# Patient Record
Sex: MALE | Race: WHITE | Employment: FULL TIME
[De-identification: names, ages, dates, MRNs, and addresses within clinical notes are randomized per-mention and may not be internally consistent; named-entity substitution may affect disease eponyms.]

---

## 2022-03-19 PROBLEM — R97.20 ELEVATED PSA: Status: ACTIVE | Noted: 2017-08-02

## 2023-04-13 ENCOUNTER — HOSPITAL ENCOUNTER (OUTPATIENT)
Facility: HOSPITAL | Age: 63
Setting detail: RECURRING SERIES
Discharge: HOME | End: 2023-04-16
Payer: COMMERCIAL

## 2023-04-13 PROCEDURE — 97140 MANUAL THERAPY 1/> REGIONS: CPT

## 2023-04-13 PROCEDURE — 97162 PT EVAL MOD COMPLEX 30 MIN: CPT

## 2023-04-19 ENCOUNTER — HOSPITAL ENCOUNTER (OUTPATIENT)
Facility: HOSPITAL | Age: 63
Setting detail: RECURRING SERIES
Discharge: HOME | End: 2023-04-22
Payer: COMMERCIAL

## 2023-04-19 PROCEDURE — 97110 THERAPEUTIC EXERCISES: CPT

## 2023-04-19 PROCEDURE — 97140 MANUAL THERAPY 1/> REGIONS: CPT

## 2023-04-19 NOTE — PROGRESS NOTES
[facility]   5/3/2023  3:00 PM Jesus Mortensen, PTA [facility]   5/8/2023  3:00 PM Jesus Mortensen, PTA [facility]   5/10/2023  3:00 PM Jesus Mortensen, PTA [facility]   5/15/2023  3:00 PM Jesus Mortensen, PTA [facility]   5/17/2023  3:00 PM Jan Loving, PT [facility]   5/22/2023  3:00 PM Jesus Mortensen, PTA [facility]   5/24/2023  3:00 PM Jesus Mortensen, PTA [facility]   5/30/2023  3:00 PM Caryl Franco, PT [facility]

## 2023-04-24 ENCOUNTER — HOSPITAL ENCOUNTER (OUTPATIENT)
Facility: HOSPITAL | Age: 63
Setting detail: RECURRING SERIES
Discharge: HOME | End: 2023-04-27
Payer: COMMERCIAL

## 2023-04-24 PROCEDURE — 97140 MANUAL THERAPY 1/> REGIONS: CPT

## 2023-04-24 PROCEDURE — 97110 THERAPEUTIC EXERCISES: CPT

## 2023-04-24 NOTE — PROGRESS NOTES
PHYSICAL / OCCUPATIONAL THERAPY - DAILY TREATMENT NOTE (updated )    Patient Name: Krystyna Johnsonney    Date: 2023    : 1960  Insurance: Payor: Trevor Osler / Plan: Donnell Verma / Product Type: *No Product type* /      Patient  verified Yes     Visit #   Current / Total 3 32   Time   In / Out 300 340   Pain   In / Out 0/10 0/10   Subjective Functional Status/Changes: \"Shoulder feels great\"   Changes to:  Meds, Allergies, Med Hx, Sx Hx? If yes, update Summary List no       TREATMENT AREA =  Right shoulder pain [M25.511]    OBJECTIVE         Therapeutic Procedures: Tx Min Billable or 1:1 Min (if diff from Tx Min) Procedure, Rationale, Specifics   25  48997 Therapeutic Exercise (timed):  increase ROM, strength, coordination, balance, and proprioception to improve patient's ability to progress to PLOF and address remaining functional goals. (see flow sheet as applicable)     Details if applicable:       15  98169 Manual Therapy (timed):  decrease pain, increase ROM, and increase tissue extensibility to improve patient's ability to progress to PLOF and address remaining functional goals. The manual therapy interventions were performed at a separate and distinct time from the therapeutic activities interventions .  (see flow sheet as applicable)     Details if applicable:  R shoulder PROM within protocol; STM to R posterior RC, R UT          Details if applicable:            Details if applicable:            Details if applicable:     36   BC Totals Reminder: bill using total billable min of TIMED therapeutic procedures (example: do not include dry needle or estim unattended, both untimed codes, in totals to left)  8-22 min = 1 unit; 23-37 min = 2 units; 38-52 min = 3 units; 53-67 min = 4 units; 68-82 min = 5 units   Total Total     [x]  Patient Education billed concurrently with other procedures   [x] Review HEP    [] Progressed/Changed HEP, detail:    [] Other detail:       Objective

## 2023-04-26 ENCOUNTER — HOSPITAL ENCOUNTER (OUTPATIENT)
Facility: HOSPITAL | Age: 63
Setting detail: RECURRING SERIES
Discharge: HOME | End: 2023-04-29
Payer: COMMERCIAL

## 2023-04-26 PROCEDURE — 97110 THERAPEUTIC EXERCISES: CPT

## 2023-04-26 PROCEDURE — 97140 MANUAL THERAPY 1/> REGIONS: CPT

## 2023-04-26 NOTE — PROGRESS NOTES
PHYSICAL / OCCUPATIONAL THERAPY - DAILY TREATMENT NOTE (updated )    Patient Name: Meghan Diane    Date: 2023    : 1960  Insurance: Payor: Facundo Mcgee / Plan: Anabell Bowers / Product Type: *No Product type* /      Patient  verified Yes     Visit #   Current / Total 4 32   Time   In / Out 2:54 3:30   Pain   In / Out 0/10 0/10   Subjective Functional Status/Changes: Pt reports he does not experience any pain in his shoulder, just tightness. Notes good compliance with HEP and sling wear. Sleeping supine in bed with sling donned. Changes to:  Meds, Allergies, Med Hx, Sx Hx? If yes, update Summary List no       TREATMENT AREA =  Right shoulder pain [M25.511]    OBJECTIVE         Therapeutic Procedures: Tx Min Billable or 1:1 Min (if diff from Tx Min) Procedure, Rationale, Specifics   110 Therapeutic Exercise (timed):  increase ROM, strength, coordination, balance, and proprioception to improve patient's ability to progress to PLOF and address remaining functional goals. (see flow sheet as applicable)     Details if applicable:       10 10 30736 Manual Therapy (timed):  decrease pain, increase ROM, and increase tissue extensibility to improve patient's ability to progress to PLOF and address remaining functional goals. The manual therapy interventions were performed at a separate and distinct time from the therapeutic activities interventions .  (see flow sheet as applicable)     Details if applicable:  light STM to R infraspinatus, scapular upward rotation glides, PROM flex, abd, ER          Details if applicable:            Details if applicable:            Details if applicable:     39 36 [facility] BC Totals Reminder: bill using total billable min of TIMED therapeutic procedures (example: do not include dry needle or estim unattended, both untimed codes, in totals to left)  8-22 min = 1 unit; 23-37 min = 2 units; 38-52 min = 3 units; 53-67 min = 4 units; 68-82 min = 5 units   Total Total     [x]

## 2023-05-01 ENCOUNTER — HOSPITAL ENCOUNTER (OUTPATIENT)
Facility: HOSPITAL | Age: 63
Setting detail: RECURRING SERIES
Discharge: HOME | End: 2023-05-04
Payer: COMMERCIAL

## 2023-05-01 PROCEDURE — 97110 THERAPEUTIC EXERCISES: CPT

## 2023-05-01 PROCEDURE — 97140 MANUAL THERAPY 1/> REGIONS: CPT

## 2023-05-01 NOTE — PROGRESS NOTES
PHYSICAL / OCCUPATIONAL THERAPY - DAILY TREATMENT NOTE (updated )    Patient Name: Sailaja Godfrey    Date: 2023    : 1960  Insurance: Payor: Lele Portillo / Plan: Leo Iqbal / Product Type: *No Product type* /      Patient  verified Yes     Visit #   Current / Total 5 32   Time   In / Out 3:00 3:49   Pain   In / Out 0/10 0/10    Subjective Functional Status/Changes: I don't really have much pain in my shoulder, I mainly just feel the aching and tension in my neck and biceps more than anything. Changes to:  Meds, Allergies, Med Hx, Sx Hx? If yes, update Summary List yes       TREATMENT AREA =  Right shoulder pain [M25.511]    OBJECTIVE         Therapeutic Procedures: Tx Min Billable or 1:1 Min (if diff from Tx Min) Procedure, Rationale, Specifics   35 26 [id number] Therapeutic Exercise (timed):  increase ROM, strength, coordination, balance, and proprioception to improve patient's ability to progress to PLOF and address remaining functional goals. (see flow sheet as applicable)     Details if applicable:        38100 Manual Therapy (timed):  decrease pain, increase ROM, increase tissue extensibility, decrease trigger points, and increase postural awareness to improve patient's ability to progress to PLOF and address remaining functional goals. The manual therapy interventions were performed at a separate and distinct time from the therapeutic activities interventions .  (see flow sheet as applicable)     Details if applicable:  STM/tissue mobs to infraspinatus/teres minor, side lying scapular glides, gentle biceps stretching and GH PROM all planes           Details if applicable:            Details if applicable:            Details if applicable:     52 40 [facility] Totals Reminder: bill using total billable min of TIMED therapeutic procedures (example: do not include dry needle or estim unattended, both untimed codes, in totals to left)  8-22 min = 1 unit; 23-37 min = 2 units; 38-52 min = 3 units;

## 2023-05-03 ENCOUNTER — HOSPITAL ENCOUNTER (OUTPATIENT)
Facility: HOSPITAL | Age: 63
Setting detail: RECURRING SERIES
Discharge: HOME | End: 2023-05-06
Payer: COMMERCIAL

## 2023-05-03 PROCEDURE — 97140 MANUAL THERAPY 1/> REGIONS: CPT

## 2023-05-03 NOTE — PROGRESS NOTES
PHYSICAL / OCCUPATIONAL THERAPY - DAILY TREATMENT NOTE (updated )    Patient Name: Tere Doing    Date: 5/3/2023    : 1960  Insurance: Payor: Koby Cancel / Plan: Lilli Arzate / Product Type: *No Product type* /      Patient  verified Yes     Visit #   Current / Total 6 32   Time   In / Out 300 326   Pain   In / Out 0/10 010   Subjective Functional Status/Changes: Patient states his shoulder has been feeling better overall. Patient denies any increased pain since his last appointment. Changes to:  Meds, Allergies, Med Hx, Sx Hx? If yes, update Summary List no       TREATMENT AREA =  Right shoulder pain [M25.511]    OBJECTIVE         Therapeutic Procedures: Tx Min Billable or 1:1 Min (if diff from Tx Min) Procedure, Rationale, Specifics   16 8 47684 Therapeutic Exercise (timed):  increase ROM, strength, coordination, balance, and proprioception to improve patient's ability to progress to PLOF and address remaining functional goals. (see flow sheet as applicable)     Details if applicable:       10 10 71620 Manual Therapy (timed):  decrease pain, increase ROM, increase tissue extensibility, and decrease trigger points to improve patient's ability to progress to PLOF and address remaining functional goals. The manual therapy interventions were performed at a separate and distinct time from the therapeutic activities interventions .  (see flow sheet as applicable)     Details if applicable:  STM to R posterior RC, R anterior deltoid; R shoulder PROM into flexion, abduction and ER per protocol          Details if applicable:            Details if applicable:            Details if applicable:     26 18 [facility] Totals Reminder: bill using total billable min of TIMED therapeutic procedures (example: do not include dry needle or estim unattended, both untimed codes, in totals to left)  8-22 min = 1 unit; 23-37 min = 2 units; 38-52 min = 3 units; 53-67 min = 4 units; 68-82 min = 5 units   Total Total     [x]

## 2023-05-08 ENCOUNTER — HOSPITAL ENCOUNTER (OUTPATIENT)
Facility: HOSPITAL | Age: 63
Setting detail: RECURRING SERIES
Discharge: HOME | End: 2023-05-11
Payer: COMMERCIAL

## 2023-05-08 PROCEDURE — 97110 THERAPEUTIC EXERCISES: CPT

## 2023-05-08 PROCEDURE — 97530 THERAPEUTIC ACTIVITIES: CPT

## 2023-05-08 PROCEDURE — 97140 MANUAL THERAPY 1/> REGIONS: CPT

## 2023-05-08 NOTE — PROGRESS NOTES
PHYSICAL / OCCUPATIONAL THERAPY - DAILY TREATMENT NOTE (updated )    Patient Name: Irma Escobar    Date: 2023    : 1960  Insurance: Payor: Caren Curling / Plan: Palmer Rose / Product Type: *No Product type* /      Patient  verified Yes     Visit #   Current / Total 7 32   Time   In / Out 3:02 3\"47   Pain   In / Out 0/10 0/10   Subjective Functional Status/Changes: I really don't have any pain in my shoulder and I have been trying to be careful so that I am not using it to activate the muscles like you guys told me to do. Changes to:  Meds, Allergies, Med Hx, Sx Hx? If yes, update Summary List no       TREATMENT AREA =  Right shoulder pain [M25.511]    OBJECTIVE         Therapeutic Procedures: Tx Min Billable or 1:1 Min (if diff from Tx Min) Procedure, Rationale, Specifics   21 14 22816 Therapeutic Exercise (timed):  increase ROM, strength, coordination, balance, and proprioception to improve patient's ability to progress to PLOF and address remaining functional goals. (see flow sheet as applicable)     Details if applicable:        25161 Therapeutic Activity (timed):  use of dynamic activities replicating functional movements to increase ROM, strength, coordination, balance, and proprioception in order to improve patient's ability to progress to PLOF and address remaining functional goals. (see flow sheet as applicable)     Details if applicable:      98897 Manual Therapy (timed):  decrease pain, increase ROM, increase tissue extensibility, and increase postural awareness to improve patient's ability to progress to PLOF and address remaining functional goals. The manual therapy interventions were performed at a separate and distinct time from the therapeutic activities interventions .  (see flow sheet as applicable)     Details if applicable:  STM/tissue mobs to infraspinatus/teres minor, side lying scapular glides and GH PROM all planes           Details if applicable:

## 2023-05-08 NOTE — PROGRESS NOTES
[address] [place] [address] PHYSICAL THERAPY  [facility], [place], [address]  Phone: [phone]   [phone]  PHYSICAL THERAPY PROGRESS NOTE  Patient Name: Dinora Youssef : 1960   Treatment/Medical Diagnosis: Right shoulder pain [M25.511]   Referral Source: Raina Alexandra*     Date of Initial Visit: 23 Attended Visits: 7 Missed Visits: 0     SUMMARY OF TREATMENT  Therapeutic Exercise for UE/shoulder focused mobility within   parameters/limitations of current passive protocol guidelines. manual intervention, cryotherapy, patient education and HEP. CURRENT STATUS  Pt reports 65% overall improvement: improvement since initial evaluation with no reoccurrence of pain in > 1 week. Pt is making good progress with gaining passive mobility at expectation within parameters/limitations of current passive protocol guidelines. Current improvements: Pt reports the most functional improvement with decreased frequency/intensity of pain/discomfort as well as increased passive mobility with minimal pain with pushing all allowable maximum passive end ranges. Remaining functional limitations: Limitations within  parameters/limitations of current passive protocol guidelines. Objective measures:  R shoulder PROM (Measured in supine)= Flexion= 140 deg; Scaption/Abduction= 120 deg; ER= 55 deg (measured at approximately 45 degrees abduction) ; IR= not assessed       FOTO 58/100    SHORT TERM GOALS:    Pt will be educated in/compliant with appropriate HEP to decrease pain, increase ROM, increase strength and return pt to PLOF. Status at last Eval: issued at POC  Current Status: Pt reports independence and compliance with current HEP  Goal Met?  yes    2. Pt will increase R shoulder flexion PROM to at least 120 deg as per MD protocol for improved ease with dressing. Status at last Eval: 85 deg  Current Status: 140 deg  Goal Met?  yes    3.  Pt will improve R

## 2023-05-10 ENCOUNTER — HOSPITAL ENCOUNTER (OUTPATIENT)
Facility: HOSPITAL | Age: 63
Setting detail: RECURRING SERIES
Discharge: HOME | End: 2023-05-13
Payer: COMMERCIAL

## 2023-05-10 PROCEDURE — 97110 THERAPEUTIC EXERCISES: CPT

## 2023-05-10 PROCEDURE — 97140 MANUAL THERAPY 1/> REGIONS: CPT

## 2023-05-10 PROCEDURE — 97530 THERAPEUTIC ACTIVITIES: CPT

## 2023-05-10 NOTE — PROGRESS NOTES
PHYSICAL / OCCUPATIONAL THERAPY - DAILY TREATMENT NOTE (updated )    Patient Name: Erik Herrera    Date: 5/10/2023    : 1960  Insurance: Payor: Moan Knapp / Plan: Sabino Marcelino / Product Type: *No Product type* /      Patient  verified Yes     Visit #   Current / Total 8 32   Time   In / Out 3:06 3:52   Pain   In / Out 0/10 0/10   Subjective Functional Status/Changes: Pt reports Dr Messi Flores was very pleased with his motion; Dc'd sling and will follow up in another month. Changes to:  Meds, Allergies, Med Hx, Sx Hx? If yes, update Summary List no       TREATMENT AREA =  Right shoulder pain [M25.511]    OBJECTIVE         Therapeutic Procedures: Tx Min Billable or 1:1 Min (if diff from Tx Min) Procedure, Rationale, Specifics    23690 Therapeutic Exercise (timed):  increase ROM, strength, coordination, balance, and proprioception to improve patient's ability to progress to PLOF and address remaining functional goals. (see flow sheet as applicable)     Details if applicable:       15 12 00996 Therapeutic Activity (timed):  use of dynamic activities replicating functional movements to increase ROM, strength, coordination, balance, and proprioception in order to improve patient's ability to progress to PLOF and address remaining functional goals. (see flow sheet as applicable)     Details if applicable:     10 10 47819 Manual Therapy (timed):  decrease pain, increase ROM, increase tissue extensibility, and increase postural awareness to improve patient's ability to progress to PLOF and address remaining functional goals. The manual therapy interventions were performed at a separate and distinct time from the therapeutic activities interventions .  (see flow sheet as applicable)     Details if applicable:  STM to R post cuff, gr II upward rotational glide R ST joint, PROM flex, abd, ER, IR in scapular plane          Details if applicable:            Details if applicable:     55 41 [facility] BC Totals

## 2023-05-15 ENCOUNTER — HOSPITAL ENCOUNTER (OUTPATIENT)
Facility: HOSPITAL | Age: 63
Setting detail: RECURRING SERIES
Discharge: HOME | End: 2023-05-18
Payer: COMMERCIAL

## 2023-05-15 PROCEDURE — 97110 THERAPEUTIC EXERCISES: CPT

## 2023-05-15 PROCEDURE — 97530 THERAPEUTIC ACTIVITIES: CPT

## 2023-05-15 PROCEDURE — 97140 MANUAL THERAPY 1/> REGIONS: CPT

## 2023-05-15 NOTE — PROGRESS NOTES
5/10/23: progressed to [facility] flexion/scaption today to address  4. Pt will increase R shoulder ER AROM 30 deg for ease with reaching 5/10/23: added cane ER AAROM today to address    PLAN    Continue plan of care  [x]  Upgrade activities as tolerated  []  Discharge due to :  []  Other:    Ivonne Rubio PTA    5/15/2023    3:05 PM    Future Appointments   Date Time Provider Francisco Ashraf   5/17/2023  3:00 PM Gino Rogers, PT [facility]   5/22/2023  3:00 PM Willy Hollins, PT [facility]   5/24/2023  3:00 PM Willy Hollins, PT [facility]   5/30/2023  3:00 PM Emilee Reyes, PT [facility]   6/1/2023  3:00 PM Willy Hollins, PT [facility]   6/5/2023  3:00 PM Willy Hollins, PT [facility]   6/7/2023  3:00 PM Ivonne Rubio, PTA [facility]   6/12/2023  3:00 PM Willy Hollins, PT [facility]   6/14/2023  3:00 PM Ivonne Rubio, PTA [facility]
no

## 2023-05-17 ENCOUNTER — HOSPITAL ENCOUNTER (OUTPATIENT)
Facility: HOSPITAL | Age: 63
Setting detail: RECURRING SERIES
Discharge: HOME | End: 2023-05-20
Payer: COMMERCIAL

## 2023-05-17 PROCEDURE — 97140 MANUAL THERAPY 1/> REGIONS: CPT | Performed by: PHYSICAL THERAPIST

## 2023-05-17 PROCEDURE — 97110 THERAPEUTIC EXERCISES: CPT | Performed by: PHYSICAL THERAPIST

## 2023-05-17 PROCEDURE — 97530 THERAPEUTIC ACTIVITIES: CPT | Performed by: PHYSICAL THERAPIST

## 2023-05-17 NOTE — PROGRESS NOTES
of care  [x]  Upgrade activities as tolerated  []  Discharge due to :  []  Other:    Kenji Everton, PT    5/17/2023    10:17 AM    Future Appointments   Date Time Provider Francisco Ashraf   5/17/2023  3:00 PM [facility] PT [place]ASQUEZD 2 [facility]   5/22/2023  3:00 PM Crescencio Fitzgerald, PT [facility]   5/24/2023  3:00 PM Crescencio Fitzgerald, PT [facility]   5/30/2023  3:00 PM Carolynn Barger, PT [facility]   6/1/2023  3:00 PM Crescencio Fitzgerald, PT [facility]   6/5/2023  3:00 PM Crescencio Fitzgerald, PT [facility]   6/7/2023  3:00 PM Silvia Barger, PTA [facility]   6/12/2023  3:00 PM Crescencio Fitzgerald, PT [facility]   6/14/2023  3:00 PM Silvia Barger PTA [facility]

## 2023-05-22 ENCOUNTER — HOSPITAL ENCOUNTER (OUTPATIENT)
Facility: HOSPITAL | Age: 63
Setting detail: RECURRING SERIES
Discharge: HOME | End: 2023-05-25
Payer: COMMERCIAL

## 2023-05-22 PROCEDURE — 97530 THERAPEUTIC ACTIVITIES: CPT

## 2023-05-22 PROCEDURE — 97140 MANUAL THERAPY 1/> REGIONS: CPT

## 2023-05-22 PROCEDURE — 97110 THERAPEUTIC EXERCISES: CPT

## 2023-05-22 NOTE — PROGRESS NOTES
PHYSICAL / OCCUPATIONAL THERAPY - DAILY TREATMENT NOTE (updated )    Patient Name: Juvenal Media    Date: 2023    : 1960  Insurance: Payor: Kyara KHANamishronald 150 / Plan: Clorielisabeth Locust / Product Type: *No Product type* /      Patient  verified Yes     Visit #   Current / Total 11 32   Time   In / Out 3:00 3:40   Pain   In / Out 0/10 0   Subjective Functional Status/Changes: Pt reports having very little pain. Is pleased with his progress thus far. Changes to:  Meds, Allergies, Med Hx, Sx Hx? If yes, update Summary List no       TREATMENT AREA =  Right shoulder pain [M25.511]    OBJECTIVE         Therapeutic Procedures: Tx Min Billable or 1:1 Min (if diff from Tx Min) Procedure, Rationale, Specifics   17 15 55278 Therapeutic Exercise (timed):  increase ROM, strength, coordination, balance, and proprioception to improve patient's ability to progress to PLOF and address remaining functional goals. (see flow sheet as applicable)     Details if applicable:       15 15 38844 Therapeutic Activity (timed):  use of dynamic activities replicating functional movements to increase ROM, strength, coordination, balance, and proprioception in order to improve patient's ability to progress to PLOF and address remaining functional goals. (see flow sheet as applicable)     Details if applicable:     8 8 71015 Manual Therapy (timed):  decrease pain, increase ROM, increase tissue extensibility, decrease trigger points, and increase postural awareness to improve patient's ability to progress to PLOF and address remaining functional goals. The manual therapy interventions were performed at a separate and distinct time from the therapeutic activities interventions .  (see flow sheet as applicable)     Details if applicable:  STM to R post cuff; scapular glides gr II; PROM flex, abd, ER, IR; Rhythmic stab at 90 deg of flex 3 x 15\"          Details if applicable:            Details if applicable:     40 38 [facility] BC Totals Reminder:

## 2023-05-24 ENCOUNTER — HOSPITAL ENCOUNTER (OUTPATIENT)
Facility: HOSPITAL | Age: 63
Setting detail: RECURRING SERIES
Discharge: HOME | End: 2023-05-27
Payer: COMMERCIAL

## 2023-05-24 PROCEDURE — 97110 THERAPEUTIC EXERCISES: CPT

## 2023-05-24 PROCEDURE — 97140 MANUAL THERAPY 1/> REGIONS: CPT

## 2023-05-24 NOTE — PROGRESS NOTES
Continue plan of care  [x]  Upgrade activities as tolerated  []  Discharge due to :  []  Other:    Taurus Lamas, PT    5/24/2023    3:21 PM    Future Appointments   Date Time Provider Francisco Ashraf   5/30/2023  3:00 PM Yesy Bain, PT [facility]   6/1/2023  3:00 PM Taurus Lamas, PT [facility]   6/5/2023  3:00 PM Taurus Lamas, PT [facility]   6/7/2023  3:00 PM Divya Zafar, DEMI [facility]   6/12/2023  3:00 PM Taurus Lamas, PT [facility]   6/14/2023  3:00 PM Divya Zafar, DEMI [facility]

## 2023-05-30 ENCOUNTER — HOSPITAL ENCOUNTER (OUTPATIENT)
Facility: HOSPITAL | Age: 63
Setting detail: RECURRING SERIES
Discharge: HOME | End: 2023-06-02
Payer: COMMERCIAL

## 2023-05-30 PROCEDURE — 97140 MANUAL THERAPY 1/> REGIONS: CPT

## 2023-05-30 PROCEDURE — 97530 THERAPEUTIC ACTIVITIES: CPT

## 2023-05-30 PROCEDURE — 97110 THERAPEUTIC EXERCISES: CPT

## 2023-05-30 NOTE — PROGRESS NOTES
PHYSICAL / OCCUPATIONAL THERAPY - DAILY TREATMENT NOTE (updated )    Patient Name: Duglas Lopez    Date: 2023    : 1960  Insurance: Payor: Roscoe Lindsay / Plan: Pastora Soto / Product Type: *No Product type* /      Patient  verified Yes     Visit #   Current / Total 13 32   Time   In / Out 2:57 3:45   Pain   In / Out 0 0   Subjective Functional Status/Changes: Pt was making a roast Sun and pulling meat out with tongs caused sharp \"snag\" pain; but back to baseline after a few minutes. Continues to have most pain and limitation to horizontal adduction reaching   Changes to:  Meds, Allergies, Med Hx, Sx Hx? If yes, update Summary List no       TREATMENT AREA =  Right shoulder pain [M25.511]    OBJECTIVE         Therapeutic Procedures: Tx Min Billable or 1:1 Min (if diff from Tx Min) Procedure, Rationale, Specifics   28 18 48690 Therapeutic Exercise (timed):  increase ROM, strength, coordination, balance, and proprioception to improve patient's ability to progress to PLOF and address remaining functional goals. (see flow sheet as applicable)     Details if applicable:       10 10 65263 Therapeutic Activity (timed):  use of dynamic activities replicating functional movements to increase ROM, strength, coordination, balance, and proprioception in order to improve patient's ability to progress to PLOF and address remaining functional goals. (see flow sheet as applicable)     Details if applicable:     10 10 72893 Manual Therapy (timed):  decrease pain, increase ROM, increase tissue extensibility, decrease trigger points, and increase postural awareness to improve patient's ability to progress to PLOF and address remaining functional goals. The manual therapy interventions were performed at a separate and distinct time from the therapeutic activities interventions .  (see flow sheet as applicable)     Details if applicable:  STM to R post cuff; PROM flex, abd, ER, IR; Rhythmic stab at 90 deg of flex 3 x

## 2023-06-01 ENCOUNTER — HOSPITAL ENCOUNTER (OUTPATIENT)
Facility: HOSPITAL | Age: 63
Setting detail: RECURRING SERIES
Discharge: HOME | End: 2023-06-04
Payer: COMMERCIAL

## 2023-06-01 PROCEDURE — 97140 MANUAL THERAPY 1/> REGIONS: CPT

## 2023-06-01 PROCEDURE — 97110 THERAPEUTIC EXERCISES: CPT

## 2023-06-05 ENCOUNTER — HOSPITAL ENCOUNTER (OUTPATIENT)
Facility: HOSPITAL | Age: 63
Setting detail: RECURRING SERIES
Discharge: HOME | End: 2023-06-08
Payer: COMMERCIAL

## 2023-06-05 PROCEDURE — 97110 THERAPEUTIC EXERCISES: CPT

## 2023-06-05 PROCEDURE — 97140 MANUAL THERAPY 1/> REGIONS: CPT

## 2023-06-05 NOTE — PROGRESS NOTES
PHYSICAL / OCCUPATIONAL THERAPY - DAILY TREATMENT NOTE (updated )    Patient Name: Bam Lovelace    Date: 2023    : 1960  Insurance: Payor: Zoe Lemus / Plan: Erika Alcala / Product Type: *No Product type* /      Patient  verified Yes     Visit #   Current / Total 15 32   Time   In / Out 2:58 3:39   Pain   In / Out 0 0   Subjective Functional Status/Changes: Pt reports his shoulder felt good after his last visit \"I went home and threw some ice on it and it was good\"   Changes to:  Meds, Allergies, Med Hx, Sx Hx? If yes, update Summary List no       TREATMENT AREA =  Right shoulder pain [M25.511]    OBJECTIVE         Therapeutic Procedures: Tx Min Billable or 1:1 Min (if diff from Tx Min) Procedure, Rationale, Specifics   21  40891 Therapeutic Exercise (timed):  increase ROM, strength, coordination, balance, and proprioception to improve patient's ability to progress to PLOF and address remaining functional goals. (see flow sheet as applicable)     Details if applicable:       12  52887 Therapeutic Activity (timed):  use of dynamic activities replicating functional movements to increase ROM, strength, coordination, balance, and proprioception in order to improve patient's ability to progress to PLOF and address remaining functional goals. (see flow sheet as applicable)     Details if applicable:     8  43840 Manual Therapy (timed):  decrease pain, increase ROM, increase tissue extensibility, decrease trigger points, and increase postural awareness to improve patient's ability to progress to PLOF and address remaining functional goals. The manual therapy interventions were performed at a separate and distinct time from the therapeutic activities interventions .  (see flow sheet as applicable)     Details if applicable:  STM to R post cuff; PROM flex, abd, ER, IR, horizontal adduction          Details if applicable:            Details if applicable:     39 31 [address] Totals Reminder: bill using

## 2023-06-07 ENCOUNTER — HOSPITAL ENCOUNTER (OUTPATIENT)
Facility: HOSPITAL | Age: 63
Setting detail: RECURRING SERIES
Discharge: HOME | End: 2023-06-10
Payer: COMMERCIAL

## 2023-06-07 PROCEDURE — 97110 THERAPEUTIC EXERCISES: CPT

## 2023-06-07 PROCEDURE — 97530 THERAPEUTIC ACTIVITIES: CPT

## 2023-06-07 PROCEDURE — 97140 MANUAL THERAPY 1/> REGIONS: CPT

## 2023-06-07 NOTE — PROGRESS NOTES
[address] [place] [address] PHYSICAL THERAPY  [facility], [place], [address]  Phone: [phone]   [phone]  PHYSICAL THERAPY PROGRESS NOTE  Patient Name: Thai Jiang : 1960   Treatment/Medical Diagnosis: Right shoulder pain [M25.511]   Referral Source: Estella Schneider*     Date of Initial Visit: 23 Attended Visits: 16 Missed Visits: 0     SUMMARY OF TREATMENT  Pt has attended 16 sessions of PT s/p R TSA (DOS 3/30/2023). PT tx has consisted of therex, theract, NMRE, manual tx, and modalities prn in order to improve R shoulder ROM, strength, scapular stability, dec pain, and improve function. Pt has been instructed in progressive HEP. CURRENT STATUS  Pt has made excellent progress in PT thus far. Reports 80-85% overall improvement in sx. Max pain: 7/10 (fleeting, occurring while lifting a roast out of crockpot with tongs)  Avg pain: 0/10    Current improvements: general mobility, minimal/absent pain, inc ease with ADLs, ability to lay on the R side  Remaining functional limitations: tightness, strength (lifting, pushing, pulling)    Objective measures:  AROM (seated)   Shoulder flex 124, abd 120, ER (neutral) 44, ER (@ 90 abd) 33, horizontal adduction to posterior aspect of L UT    PROM (supine)   Flex 148, abd 143, ER 52 (scapular plane), IR 66 (scapular plane)  MMT:   Shoulder flex 3-/5, abd 3-/5, ER 3-/5, IR 3+/5   Elbow flex 4+/5, ext 4+/5   Mid trap 3+/5, low trap 2/5, serratus anterior 4-/5    FOTO 61/100    LONG TERM GOALS:  Pt will improve FOTO score to >/= 68 to demo a significant improvement in functional activity tolerance. Status at last Eval: 68/100  Current Status: 61/100  Goal Met?   progress    2. Pt will increase R shoulder flexion/abd strength to >/= 4/5 for improved independence with lifting/carrying tasks. Status at last Eval: not assessed  Current Status: flex 3-/5, abd 3-/5  Goal Met?   progress    3. Pt will improve R
[facility] Totals Reminder: bill using total billable min of TIMED therapeutic procedures (example: do not include dry needle or estim unattended, both untimed codes, in totals to left)  8-22 min = 1 unit; 23-37 min = 2 units; 38-52 min = 3 units; 53-67 min = 4 units; 68-82 min = 5 units   Total Total     [x]  Patient Education billed concurrently with other procedures   [] Review HEP    [] Progressed/Changed HEP, detail:  [] Other detail:       Objective Information/Functional Measures/Assessment  See PN      Patient will continue to benefit from skilled PT / OT services to modify and progress therapeutic interventions, analyze and address functional mobility deficits, analyze and address ROM deficits, analyze and address soft tissue restrictions, analyze and cue for proper movement patterns, analyze and modify for postural abnormalities, and instruct in home and community integration to address functional deficits and attain remaining goals.     Progress toward goals / Updated goals:  [x]  See Progress Note/Recertification    PLAN    X Continue plan of care  [x]  Upgrade activities as tolerated  []  Discharge due to :  []  Other:    Judge Ibrahim, PT    6/7/2023    3:07 PM    Future Appointments   Date Time Provider Francisco Ashraf   6/12/2023  3:00 PM Judge Ibrahim PT [facility]   6/14/2023  3:00 PM Minor Darian, PTA [facility]   6/19/2023  3:00 PM Minor Darian, PTA [facility]   6/21/2023  2:20 PM Judge Ibrahim PT [facility]   6/26/2023  3:00 PM [facility] PT [place] 1 [facility]   6/28/2023  3:00 PM Minor Darian, PTA [facility]

## 2023-06-19 ENCOUNTER — HOSPITAL ENCOUNTER (OUTPATIENT)
Facility: HOSPITAL | Age: 63
Setting detail: RECURRING SERIES
Discharge: HOME | End: 2023-06-22
Payer: MEDICARE

## 2023-06-19 PROCEDURE — 97110 THERAPEUTIC EXERCISES: CPT

## 2023-06-19 PROCEDURE — 97530 THERAPEUTIC ACTIVITIES: CPT

## 2023-06-21 ENCOUNTER — APPOINTMENT (OUTPATIENT)
Facility: HOSPITAL | Age: 63
End: 2023-06-21
Payer: MEDICARE

## 2023-06-26 ENCOUNTER — HOSPITAL ENCOUNTER (OUTPATIENT)
Facility: HOSPITAL | Age: 63
Setting detail: RECURRING SERIES
Discharge: HOME | End: 2023-06-29
Payer: MEDICARE

## 2023-06-26 PROCEDURE — 97112 NEUROMUSCULAR REEDUCATION: CPT

## 2023-06-26 PROCEDURE — 97530 THERAPEUTIC ACTIVITIES: CPT

## 2023-06-26 PROCEDURE — 97110 THERAPEUTIC EXERCISES: CPT

## 2023-06-28 ENCOUNTER — HOSPITAL ENCOUNTER (OUTPATIENT)
Facility: HOSPITAL | Age: 63
Setting detail: RECURRING SERIES
Discharge: HOME | End: 2023-07-01
Payer: MEDICARE

## 2023-06-28 PROCEDURE — 97110 THERAPEUTIC EXERCISES: CPT | Performed by: PHYSICAL THERAPIST

## 2023-06-28 PROCEDURE — 97530 THERAPEUTIC ACTIVITIES: CPT | Performed by: PHYSICAL THERAPIST

## 2023-06-28 PROCEDURE — 97112 NEUROMUSCULAR REEDUCATION: CPT | Performed by: PHYSICAL THERAPIST

## 2023-07-03 ENCOUNTER — APPOINTMENT (OUTPATIENT)
Facility: HOSPITAL | Age: 63
End: 2023-07-03
Payer: MEDICARE

## 2023-07-05 ENCOUNTER — HOSPITAL ENCOUNTER (OUTPATIENT)
Facility: HOSPITAL | Age: 63
Setting detail: RECURRING SERIES
Discharge: HOME | End: 2023-07-08
Payer: MEDICARE

## 2023-07-05 PROCEDURE — 97530 THERAPEUTIC ACTIVITIES: CPT

## 2023-07-05 PROCEDURE — 97110 THERAPEUTIC EXERCISES: CPT

## 2023-07-05 NOTE — PROGRESS NOTES
PHYSICAL / OCCUPATIONAL THERAPY - DAILY TREATMENT NOTE (updated )    Patient Name: Tressa Mask    Date: 2023    : 1960  Insurance: Payor: MEDICARE / Plan: MEDICARE PART A AND B / Product Type: *No Product type* /      Patient  verified Yes     Visit #   Current / Total 22 40   Time   In / Out 2:58 3:50   Pain   In / Out 0/10 0/10   Subjective Functional Status/Changes: My shoulder has been doing pretty good, I have been trying to incorporate more of the exercises that I do here at home as well as the other exercises that you already gave me to do to try to get my shoulder stronger. TREATMENT AREA =  Right shoulder pain [M25.511]    OBJECTIVE         Therapeutic Procedures: Tx Min Billable or 1:1 Min (if diff from Tx Min) Procedure, Rationale, Specifics   29  [id number] Therapeutic Exercise (timed):  increase ROM, strength, coordination, balance, and proprioception to improve patient's ability to progress to PLOF and address remaining functional goals. (see flow sheet as applicable)     Details if applicable:        98998 Therapeutic Activity (timed):  use of dynamic activities replicating functional movements to increase ROM, strength, coordination, balance, and proprioception in order to improve patient's ability to progress to PLOF and address remaining functional goals.   (see flow sheet as applicable)     Details if applicable:            Details if applicable:            Details if applicable:            Details if applicable:     46 42 [facility] Totals Reminder: bill using total billable min of TIMED therapeutic procedures (example: do not include dry needle or estim unattended, both untimed codes, in totals to left)  8-22 min = 1 unit; 23-37 min = 2 units; 38-52 min = 3 units; 53-67 min = 4 units; 68-82 min = 5 units   Total Total     [x]  Patient Education billed concurrently with other procedures   [] Review HEP    [] Progressed/Changed HEP, detail:    [] Other detail:

## 2023-07-06 NOTE — PROGRESS NOTES
[address] [facility]  [address] [address] [facility]  Phone: [phone]   [phone] [address]/[facility] PHYSICAL THERAPY          Patient Name: Sarah Gonsalves : 1960   Treatment/Medical Diagnosis: Right shoulder pain [M25.511]   Onset Date: 3/30/23    Referral Source: Laz Zavala* Start of Care [facility]): 2023   Prior Hospitalization: See Medical History Provider #: [id number]   Prior Level of Function: Progressive difficulty with lifting, reaching, carrying with RUE over the past year; work duties: administrative (computer work)   Comorbidities: H/o CABG x4 2018, arthritis, ICD   Visits from [facility]: 22 Missed Visits: 1     Progress to Goals:   Pt will improve FOTO score to >/= 68 to demo a significant improvement in functional activity tolerance. Status at last Eval:  61/100  Current Status: 67/100  Goal Met? Nearly Met    2. Pt will increase R shoulder flexion/abd strength to >/= 4/5 for improved independence with lifting/carrying tasks. Status at last Eval: flex 3-/5, abd 3-/5  Current Status:  flex 4-/5, abd 4-/5  Goal Met?   progress    3.   Improve R shoulder ER AROM to at least 40 deg (in scapular plane) to improve ability to return to golf  Status at last Eval:  ER (neutral) 44, ER (90 deg abd) 33  Current Status: 44 deg (measured with arm at side in neutral position) 35 deg in scapular plane  Goal Met?   progress    Key Functional Changes/Progress:  % improvement: 90%  Max pain 0/10 pain with no reoccurrence reported over the past 2 weeks        Current improvements: general mobility, minimal/absent pain, inc ease with ADLs, ability to lay on the R side  Remaining functional limitations: has not returned to playing premorbid golf activity, only practicing swing over the past few visits, as well as limited ability to return to full premorbid gym based UE workout regiment (approximately 70-75% of full

## 2023-07-10 ENCOUNTER — HOSPITAL ENCOUNTER (OUTPATIENT)
Facility: HOSPITAL | Age: 63
Setting detail: RECURRING SERIES
Discharge: HOME | End: 2023-07-13
Payer: MEDICARE

## 2023-07-10 PROCEDURE — 97530 THERAPEUTIC ACTIVITIES: CPT

## 2023-07-10 PROCEDURE — 97110 THERAPEUTIC EXERCISES: CPT

## 2023-07-10 NOTE — PROGRESS NOTES
PHYSICAL / OCCUPATIONAL THERAPY - DAILY TREATMENT NOTE (updated )    Patient Name: Lois Acosta    Date: 7/10/2023    : 1960  Insurance: Payor: MEDICARE / Plan: MEDICARE PART A AND B / Product Type: *No Product type* /      Patient  verified Yes     Visit #   Current / Total 23 28   Time   In / Out 3:02 3:55   Pain   In / Out 0/10 0/10   Subjective Functional Status/Changes: My shoulder is feeling pretty good today, nothing really new at all to speak of since I was here last week. TREATMENT AREA =  Right shoulder pain [M25.511]    OBJECTIVE         Therapeutic Procedures: Tx Min Billable or 1:1 Min (if diff from Tx Min) Procedure, Rationale, Specifics   43 43 [id number] Therapeutic Exercise (timed):  increase ROM, strength, coordination, balance, and proprioception to improve patient's ability to progress to PLOF and address remaining functional goals. (see flow sheet as applicable)     Details if applicable:       10 10 42410 Therapeutic Activity (timed):  use of dynamic activities replicating functional movements to increase ROM, strength, coordination, balance, and proprioception in order to improve patient's ability to progress to PLOF and address remaining functional goals.   (see flow sheet as applicable)     Details if applicable:                   Details if applicable:            Details if applicable:     48 53 [facility] Totals Reminder: bill using total billable min of TIMED therapeutic procedures (example: do not include dry needle or estim unattended, both untimed codes, in totals to left)  8-22 min = 1 unit; 23-37 min = 2 units; 38-52 min = 3 units; 53-67 min = 4 units; 68-82 min = 5 units   Total Total     [x]  Patient Education billed concurrently with other procedures   [] Review HEP    [] Progressed/Changed HEP, detail:    [] Other detail:       Objective Information/Functional Measures/Assessment  Verbal cueing/demonstration for proper form/technique with various exercises/activities

## 2023-07-12 ENCOUNTER — HOSPITAL ENCOUNTER (OUTPATIENT)
Facility: HOSPITAL | Age: 63
Setting detail: RECURRING SERIES
Discharge: HOME | End: 2023-07-15
Payer: MEDICARE

## 2023-07-12 PROCEDURE — 97530 THERAPEUTIC ACTIVITIES: CPT

## 2023-07-12 PROCEDURE — 97110 THERAPEUTIC EXERCISES: CPT

## 2023-07-12 NOTE — PROGRESS NOTES
program last visit and was able to advance from 2 to 3 lbs with flexion and abduction PRE's with moderate challenge with strength and endurance today. May try to advance gym based therex focused program further next treatment as tolerated. Will continue to progress/advance within current POC with monitoring symptoms as tolerated. Patient will continue to benefit from skilled PT / OT services to modify and progress therapeutic interventions, analyze and address functional mobility deficits, analyze and address ROM deficits, analyze and address strength deficits, analyze and address soft tissue restrictions, analyze and cue for proper movement patterns, analyze and modify for postural abnormalities, and instruct in home and community integration to address functional deficits and attain remaining goals. Progress toward goals / Updated goals:  []  See Progress Note/Recertification  Goals for this certification period include and are to be achieved in   6  treatments:     1. Pt will improve FOTO score to >/= 68 to demo a significant improvement in functional activity tolerance. Status at last note/certification: 47/432  Current: 2. Pt will increase R shoulder flexion/abd strength to >/= 4/5 for improved independence with lifting/carrying tasks. Status at last note/certification: 44 deg (measured with arm at side in neutral position) 35 deg in scapular plane  Current:     3. Improve R shoulder ER AROM to at least 40 deg (in scapular plane) to improve ability to return to golf  Status at last note/certification: 44 deg (measured with arm at side in neutral position) 35 deg in scapular plane  Current:     4.  Pt will demo ability to perform 3x10 dumbbell bench @ 20# to enable pt return to PLOF  Status at last note/certification: reports 70% pre-morbid capabilities in the gym with upper body workouts  Current: 7/10/23: Progressing, Pt advanced to addition of dumb bell bench presses, lateral versus scaption dumb

## 2023-07-17 ENCOUNTER — HOSPITAL ENCOUNTER (OUTPATIENT)
Facility: HOSPITAL | Age: 63
Setting detail: RECURRING SERIES
Discharge: HOME | End: 2023-07-20
Payer: MEDICARE

## 2023-07-17 PROCEDURE — 97530 THERAPEUTIC ACTIVITIES: CPT

## 2023-07-17 PROCEDURE — 97110 THERAPEUTIC EXERCISES: CPT

## 2023-07-17 NOTE — PROGRESS NOTES
PHYSICAL / OCCUPATIONAL THERAPY - DAILY TREATMENT NOTE (updated )    Patient Name: Nina Jimenez    Date: 2023    : 1960  Insurance: Payor: MEDICARE / Plan: MEDICARE PART A AND B / Product Type: *No Product type* /      Patient  verified Yes     Visit #   Current / Total 25 28   Time   In / Out 3:42 4:31   Pain   In / Out 0/10 0/10   Subjective Functional Status/Changes: My shoulder has been doing pretty good, the only time that I really had any pain is when I slept on it for a little while the other night and it was soreness more than pain. TREATMENT AREA =  Right shoulder pain [M25.511]    OBJECTIVE         Therapeutic Procedures: Tx Min Billable or 1:1 Min (if diff from Tx Min) Procedure, Rationale, Specifics   39 28 71320 Therapeutic Exercise (timed):  increase ROM, strength, coordination, balance, and proprioception to improve patient's ability to progress to PLOF and address remaining functional goals. (see flow sheet as applicable)     Details if applicable:       10 10 40411 Therapeutic Activity (timed):  use of dynamic activities replicating functional movements to increase ROM, strength, coordination, balance, and proprioception in order to improve patient's ability to progress to PLOF and address remaining functional goals.   (see flow sheet as applicable)     Details if applicable:            Details if applicable:            Details if applicable:            Details if applicable:     46 45 [facility] Totals Reminder: bill using total billable min of TIMED therapeutic procedures (example: do not include dry needle or estim unattended, both untimed codes, in totals to left)  8-22 min = 1 unit; 23-37 min = 2 units; 38-52 min = 3 units; 53-67 min = 4 units; 68-82 min = 5 units   Total Total     [x]  Patient Education billed concurrently with other procedures   [] Review HEP    [] Progressed/Changed HEP, detail:    [] Other detail:       Objective Information/Functional

## 2023-07-19 ENCOUNTER — HOSPITAL ENCOUNTER (OUTPATIENT)
Facility: HOSPITAL | Age: 63
Setting detail: RECURRING SERIES
Discharge: HOME | End: 2023-07-22
Payer: MEDICARE

## 2023-07-19 PROCEDURE — 97530 THERAPEUTIC ACTIVITIES: CPT

## 2023-07-19 PROCEDURE — 97110 THERAPEUTIC EXERCISES: CPT

## 2023-07-19 NOTE — PROGRESS NOTES
PHYSICAL / OCCUPATIONAL THERAPY - DAILY TREATMENT NOTE (updated )    Patient Name: Eduard Apley    Date: 2023    : 1960  Insurance: Payor: MEDICARE / Plan: MEDICARE PART A AND B / Product Type: *No Product type* /      Patient  verified Yes     Visit #   Current / Total 26 28   Time   In / Out 259 347   Pain   In / Out 0/10 010   Subjective Functional Status/Changes: Patient reports he has been exercising at the gym on a regular basis. \"I can do everything I do here at the gym. \"      TREATMENT AREA =  Right shoulder pain [M25.511]    OBJECTIVE         Therapeutic Procedures: Tx Min Billable or 1:1 Min (if diff from Tx Min) Procedure, Rationale, Specifics   28 15 57003 Therapeutic Exercise (timed):  increase ROM, strength, coordination, balance, and proprioception to improve patient's ability to progress to PLOF and address remaining functional goals. (see flow sheet as applicable)     Details if applicable:        11681 Therapeutic Activity (timed):  use of dynamic activities replicating functional movements to increase ROM, strength, coordination, balance, and proprioception in order to improve patient's ability to progress to PLOF and address remaining functional goals.   (see flow sheet as applicable)     Details if applicable:            Details if applicable:            Details if applicable:            Details if applicable:     50 35 [facility] Totals Reminder: bill using total billable min of TIMED therapeutic procedures (example: do not include dry needle or estim unattended, both untimed codes, in totals to left)  8-22 min = 1 unit; 23-37 min = 2 units; 38-52 min = 3 units; 53-67 min = 4 units; 68-82 min = 5 units   Total Total     [x]  Patient Education billed concurrently with other procedures   [x] Review HEP    [] Progressed/Changed HEP, detail:    [] Other detail:       Objective Information/Functional Measures/Assessment    Added single arm triceps ext at the [facility]

## 2023-07-24 ENCOUNTER — HOSPITAL ENCOUNTER (OUTPATIENT)
Facility: HOSPITAL | Age: 63
Setting detail: RECURRING SERIES
Discharge: HOME | End: 2023-07-27
Payer: MEDICARE

## 2023-07-24 PROCEDURE — 97110 THERAPEUTIC EXERCISES: CPT

## 2023-07-24 PROCEDURE — 97530 THERAPEUTIC ACTIVITIES: CPT
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
## 2023-07-26 ENCOUNTER — HOSPITAL ENCOUNTER (OUTPATIENT)
Facility: HOSPITAL | Age: 63
Setting detail: RECURRING SERIES
Discharge: HOME | End: 2023-07-29
Payer: MEDICARE

## 2023-07-26 PROCEDURE — 97530 THERAPEUTIC ACTIVITIES: CPT

## 2023-07-26 PROCEDURE — 97110 THERAPEUTIC EXERCISES: CPT

## 2023-07-26 NOTE — PROGRESS NOTES
[address] [facility] [employer] PHYSICAL THERAPY  [address] [address], [place]  Phone: [phone]   [phone]  DISCHARGE SUMMARY  Patient Name: Bj Coughlin : 1960   Treatment/Medical Diagnosis: Right shoulder pain [M25.511]   Referral Source: Josue Jacobo*     Date of Initial Visit: 2023 Attended Visits: 28 Missed Visits: 1     SUMMARY OF TREATMENT  Pt has attended 28 sessions of PT s/p R TSA (DOS 3/30/2023). PT tx has consisted of therex, theract, NMRE, manual tx, and modalities prn in order to improve R shoulder ROM, strength, scapular stability, dec pain, and improve function. Pt has been instructed in progressive HEP. CURRENT STATUS  Pt D/Cing today d/t meeting or progressing towards all goals, he has attended 28/28 visits since his [facility], 2023. He reports at least 90% overall improvement since [facility] with no reports of pain. He states that he has no questions regarding his comprehensive HEP and completes exercises at gym. He demonstrates increased ER ROM in scaptions and increased strength for return to PLOF at gym. Pt educated in strength training ERs as able at gym with slow progression to tolerated weight. Objective Information/Functional Measures/Assessment  Goals assessed for DC     Pain at best 0/10, at worst 0/10  Subjective % improvement 90%     Objective:   SHOULDER AROM/PROM:  ER in scaption (R) 60      Remaining functional deficits: none reported     FOTO: 56/100  HEP compliance: full compliance     Progress toward goals / Updated goals:  []  See Progress Note/Recertification  1. Pt will improve FOTO score to >/= 68 to demo a significant improvement in functional activity tolerance. Status at last note/certification:   Current: progressing, nearly met at last PN (23) 56/100  2. Pt will increase R shoulder flexion/abd strength to >/= 4/5 for improved independence with lifting/carrying tasks.   Status at last

## 2023-07-26 NOTE — PROGRESS NOTES
PHYSICAL / OCCUPATIONAL THERAPY - DAILY TREATMENT NOTE (updated )    Patient Name: Tressa Mask    Date: 2023    : 1960  Insurance: Payor: MEDICARE / Plan: MEDICARE PART A AND B / Product Type: *No Product type* /      Patient  verified Yes     Visit #   Current / Total 28 28   Time   In / Out 3:01 3:41   Pain   In / Out 0/10 0/10   Subjective Functional Status/Changes: Xenia Dominguez said that I am getting close to all of my goals at the last visit. TREATMENT AREA =  Right shoulder pain [M25.511]    OBJECTIVE:    Therapeutic Procedures: Tx Min Billable or 1:1 Min (if diff from Tx Min) Procedure, Rationale, Specifics   25 25 22641 Therapeutic Exercise (timed):  increase ROM, strength, coordination, balance, and proprioception to improve patient's ability to progress to PLOF and address remaining functional goals. (see flow sheet as applicable)     Details if applicable:    Reassessment  FOTO   15 15 50823 Therapeutic Activity (timed):  use of dynamic activities replicating functional movements to increase ROM, strength, coordination, balance, and proprioception in order to improve patient's ability to progress to PLOF and address remaining functional goals.   (see flow sheet as applicable)     Details if applicable:            Details if applicable:            Details if applicable:            Details if applicable:     36 40 [facility] Totals Reminder: bill using total billable min of TIMED therapeutic procedures (example: do not include dry needle or estim unattended, both untimed codes, in totals to left)  8-22 min = 1 unit; 23-37 min = 2 units; 38-52 min = 3 units; 53-67 min = 4 units; 68-82 min = 5 units   Total Total     [x]  Patient Education billed concurrently with other procedures   [] Review HEP    [] Progressed/Changed HEP, detail:    [] Other detail:       Objective Information/Functional Measures/Assessment  Goals assessed for DC     Pain at best 0/10, at worst 0/10  Subjective %

## 2023-07-26 NOTE — PROGRESS NOTES
Physical Therapy Discharge Instructions  [address] [address]  P: [phone] F: [phone]    Patient: Jostin Mason  : 1960    Reason for Discharge From PT:  [x]Met/progressing towards all set goals    []Minimal progress made towards set goals    []Met a plateau in progress/improvement    []Insurance/financial issues    []Other:     Recommendations:   [x] Return to activity with home program as prescribed on print-outs    [] Return to activity with the following modifications:     [] [employer] fitness training     [] Return to/join local gym    [] Other:      Continue with      [x] Ice [] Heat   as needed for 10-15 minutes to relieve pain  *If pain does not improve after several days, follow-up with your physician for a consult*           Follow up with MD:     [] Upon completion of therapy   [x] As needed      Additional Comments: Great job! Increase ER strengthening as able    Thank you for choosing [facility] [place] [place] for your PT needs!       Samantha Zabala, DEMI 2023 2:50 PM

## 2023-07-31 ENCOUNTER — APPOINTMENT (OUTPATIENT)
Facility: HOSPITAL | Age: 63
End: 2023-07-31
Payer: MEDICARE